# Patient Record
Sex: MALE | Race: WHITE | NOT HISPANIC OR LATINO | Employment: STUDENT | URBAN - METROPOLITAN AREA
[De-identification: names, ages, dates, MRNs, and addresses within clinical notes are randomized per-mention and may not be internally consistent; named-entity substitution may affect disease eponyms.]

---

## 2023-10-31 ENCOUNTER — OFFICE VISIT (OUTPATIENT)
Dept: URGENT CARE | Facility: CLINIC | Age: 23
End: 2023-10-31
Payer: COMMERCIAL

## 2023-10-31 VITALS
TEMPERATURE: 98 F | HEIGHT: 67 IN | OXYGEN SATURATION: 97 % | WEIGHT: 207 LBS | BODY MASS INDEX: 32.49 KG/M2 | HEART RATE: 78 BPM | SYSTOLIC BLOOD PRESSURE: 146 MMHG | DIASTOLIC BLOOD PRESSURE: 85 MMHG | RESPIRATION RATE: 16 BRPM

## 2023-10-31 DIAGNOSIS — K29.00 ACUTE GASTRITIS WITHOUT HEMORRHAGE, UNSPECIFIED GASTRITIS TYPE: Primary | ICD-10-CM

## 2023-10-31 DIAGNOSIS — R10.9 ABDOMINAL PAIN, UNSPECIFIED ABDOMINAL LOCATION: ICD-10-CM

## 2023-10-31 LAB
BILIRUB SERPL-MCNC: NEGATIVE MG/DL
BLOOD, POC UA: NEGATIVE
CLARITY, POC UA: CLEAR
COLOR, POC UA: NORMAL
GLUCOSE UR QL STRIP: NEGATIVE
KETONES UR QL STRIP: NEGATIVE
LEUKOCYTE ESTERASE URINE, POC: NEGATIVE
NITRITE, POC UA: NEGATIVE
PH, POC UA: 6.5
PROTEIN, POC: NEGATIVE
SPECIFIC GRAVITY, POC UA: 1.02
UROBILINOGEN, POC UA: POSITIVE

## 2023-10-31 PROCEDURE — 74022 XR ABDOMEN ACUTE 2 OR MORE VIEWS WITH CHEST: ICD-10-PCS | Mod: TC,,, | Performed by: STUDENT IN AN ORGANIZED HEALTH CARE EDUCATION/TRAINING PROGRAM

## 2023-10-31 PROCEDURE — 99499 UNLISTED E&M SERVICE: CPT | Mod: S$GLB,,, | Performed by: STUDENT IN AN ORGANIZED HEALTH CARE EDUCATION/TRAINING PROGRAM

## 2023-10-31 PROCEDURE — 74022 RADEX COMPL AQT ABD SERIES: CPT | Mod: 26,,, | Performed by: RADIOLOGY

## 2023-10-31 PROCEDURE — 99499 NO LOS: ICD-10-PCS | Mod: S$GLB,,, | Performed by: STUDENT IN AN ORGANIZED HEALTH CARE EDUCATION/TRAINING PROGRAM

## 2023-10-31 PROCEDURE — 81003 POCT URINALYSIS: ICD-10-PCS | Mod: QW,S$GLB,, | Performed by: STUDENT IN AN ORGANIZED HEALTH CARE EDUCATION/TRAINING PROGRAM

## 2023-10-31 PROCEDURE — 81003 URINALYSIS AUTO W/O SCOPE: CPT | Mod: QW,S$GLB,, | Performed by: STUDENT IN AN ORGANIZED HEALTH CARE EDUCATION/TRAINING PROGRAM

## 2023-10-31 PROCEDURE — 74022 RADEX COMPL AQT ABD SERIES: CPT | Mod: TC,,, | Performed by: STUDENT IN AN ORGANIZED HEALTH CARE EDUCATION/TRAINING PROGRAM

## 2023-10-31 PROCEDURE — 74022 XR ABDOMEN ACUTE 2 OR MORE VIEWS WITH CHEST: ICD-10-PCS | Mod: 26,,, | Performed by: RADIOLOGY

## 2023-10-31 RX ORDER — OMEPRAZOLE 40 MG/1
40 CAPSULE, DELAYED RELEASE ORAL DAILY
Qty: 30 CAPSULE | Refills: 0 | Status: SHIPPED | OUTPATIENT
Start: 2023-10-31 | End: 2023-11-30

## 2023-10-31 RX ORDER — METHYLPHENIDATE HYDROCHLORIDE 20 MG/1
TABLET ORAL
COMMUNITY
Start: 2023-09-08

## 2023-10-31 RX ORDER — LIDOCAINE HYDROCHLORIDE 20 MG/ML
10 SOLUTION OROPHARYNGEAL ONCE
Status: COMPLETED | OUTPATIENT
Start: 2023-10-31 | End: 2023-10-31

## 2023-10-31 RX ORDER — MAG HYDROX/ALUMINUM HYD/SIMETH 200-200-20
30 SUSPENSION, ORAL (FINAL DOSE FORM) ORAL
Status: COMPLETED | OUTPATIENT
Start: 2023-10-31 | End: 2023-10-31

## 2023-10-31 RX ORDER — DICYCLOMINE HYDROCHLORIDE 10 MG/5ML
20 SOLUTION ORAL
Status: COMPLETED | OUTPATIENT
Start: 2023-10-31 | End: 2023-10-31

## 2023-10-31 RX ADMIN — DICYCLOMINE HYDROCHLORIDE 20 MG: 10 SOLUTION ORAL at 10:10

## 2023-10-31 RX ADMIN — Medication 30 ML: at 10:10

## 2023-10-31 RX ADMIN — LIDOCAINE HYDROCHLORIDE 10 ML: 20 SOLUTION OROPHARYNGEAL at 10:10

## 2023-10-31 NOTE — LETTER
October 31, 2023      [place] [facility]  [facility] [place] [address]  Phone: [phone]  Fax: [phone]       Patient: Ky Collier   YOB: 2000  Date of Visit: 10/31/2023    To Whom It May Concern:    Rosa Collier  was at [facility] on 10/31/2023. The patient may return to work/school on 11/01/2023 with no restrictions. If you have any questions or concerns, or if I can be of further assistance, please do not hesitate to contact me.    Sincerely,    Dr. Rosita Machado, RT

## 2023-10-31 NOTE — PROGRESS NOTES
"Subjective:      Patient ID: Ky Collier is a 23 y.o. male.    Vitals:  height is 5' 7" (1.702 m) and weight is 93.9 kg (207 lb). His temperature is 98.3 °F (36.8 °C). His blood pressure is 146/85 (abnormal) and his pulse is 78. His respiration is 16 and oxygen saturation is 97%.     Chief Complaint: Abdominal Pain     Patient is an [employer] student with complaints of left sided abdominal pain that started 3 days ago. He states that he drank beer last Thursday and Friday. He drank 24 beers within two days. The pain started Saturday morning and has progressively gotten worse. He has been having regular bowel movements but does note that his stool is a little watery at times. The pain is worse with movement. He has no history of bowel issues or surgeries. He took  500 mg of GasEx yesterday with no relief. The pain is not moving. Tylenol helped the pain. He has sharp pain when he moves around.   He has been eating a lot of fast food. No nausea/vomiting.    Abdominal Pain  This is a new problem. The current episode started in the past 7 days. The pain is located in the LLQ and left flank. The pain is at a severity of 2/10. The pain is mild. The quality of the pain is sharp. The abdominal pain does not radiate. Associated symptoms include diarrhea. Pertinent negatives include no belching, constipation, hematochezia, hematuria or vomiting. Exacerbated by: movement. The pain is relieved by Being still. Treatments tried: GasEX. The treatment provided no relief.       Gastrointestinal:  Positive for abdominal pain and diarrhea. Negative for vomiting, constipation and bright red blood in stool.   Genitourinary:  Negative for hematuria.      Objective:     Physical Exam   HENT:   Head: Normocephalic and atraumatic.   Cardiovascular: Normal rate and regular rhythm.   Pulmonary/Chest: Effort normal and breath sounds normal.   Abdominal: Normal appearance. Soft. Bowel sounds are increased. flat abdomen There is abdominal " tenderness in the left upper quadrant and left lower quadrant. There is no rebound and no guarding.   Neurological: He is alert.   Psychiatric: His behavior is normal. Mood normal.     XR ABDOMEN, ACUTE 2 OR MORE VIEWS WITH CHEST    Result Date: 10/31/2023  EXAM:  XR ABDOMEN ACUTE 2 OR MORE VIEWS WITH CHEST CLINICAL HISTORY:  Abdominal pain FINDINGS: No free air. Nonobstructive bowel gas pattern. No abnormal masses or suspicious appearing calcifications. The skeleton is intact. The lung bases appear clear.     No evidence of bowel obstruction. Finalized on: 10/31/2023 10:18 AM By:  Steve Suarez MD [facility]# [id number]      2023-10-31 10:20:46.727    [facility]    Results for orders placed or performed in visit on 10/31/23   POCT URINALYSIS   Result Value Ref Range    WBC, UA negative     Nitrite, UA negative     Urobilinogen, UA positive     Protein, POC negative     pH, UA 6.5     Blood, UA negative     Spec Grav UA 1.025     Ketones, UA negative     Bilirubin, POC negative     Glucose, UA negative     Color, UA Light Yellow     Clarity, UA Clear       Assessment:     1. Acute gastritis without hemorrhage, unspecified gastritis type    2. Abdominal pain, unspecified abdominal location        Plan:   -patient given handout on gastritis     Acute gastritis without hemorrhage, unspecified gastritis type  -     LIDOcaine HCl 2% oral solution 10 mL  -     dicyclomine 10 mg/5 mL syrup 20 mg  -     aluminum-magnesium hydroxide-simethicone 200-200-20 mg/5 mL suspension 30 mL  -     omeprazole (PRILOSEC) 40 MG capsule; Take 1 capsule (40 mg total) by mouth once daily.  Dispense: 30 capsule; Refill: 0    Abdominal pain, unspecified abdominal location  -     POCT URINALYSIS  -     XR ABDOMEN, ACUTE 2 OR MORE VIEWS WITH CHEST; Future; Expected date: 10/31/2023  -     omeprazole (PRILOSEC) 40 MG capsule; Take 1 capsule (40 mg total) by mouth once daily.  Dispense: 30 capsule; Refill: 0      Please follow up with your primary care provider  within 2-5 days if your signs and symptoms have not resolved or worsen.     If your condition worsens or fails to improve we recommend that you receive another evaluation at the emergency room immediately or contact your primary medical clinic to discuss your concerns.   You must understand that you have received an Urgent Care treatment only and that you may be released before all of your medical problems are known or treated. You, the patient, will arrange for follow up care as instructed.        Medical Decision Making:   Differential Diagnosis:   Gastritis 2/2 to alcohol use   Independently Interpreted Test(s):   I have ordered and independently interpreted X-rays - see summary below.       <> Summary of X-Ray Reading(s): No free air. Nonobstructive bowel gas pattern. No abnormal masses or suspicious appearing calcifications. The skeleton is intact.     The lung bases appear clear.           Impression:        No evidence of bowel obstruction.  Clinical Tests:   Lab Tests: Ordered and Reviewed       <> Summary of Lab: UA neg  Radiological Study: Ordered and Reviewed  Urgent Care Management:   Patient is an [employer] student with complaints of left sided abdominal pain that started 3 days ago. He states that he drank beer last Thursday and Friday. He drank 24 beers within two days. The pain started Saturday morning and has progressively gotten worse. He has been having regular bowel movements but does note that his stool is a little watery at times. The pain is worse with movement. He has no history of bowel issues or surgeries. He took  500 mg of GasEx yesterday with no relief. The pain is not moving. Tylenol helped the pain. He has sharp pain when he moves around.   He has been eating a lot of fast food. No nausea/vomiting. PE was remarkable for tenderness in the LUQ and LLQ, he also had increased bowel sounds. UA neg. Abdominal xray was neg. The patient was given a GI cocktain in office. I sent him home with  omeprazole.

## 2023-11-30 ENCOUNTER — OFFICE VISIT (OUTPATIENT)
Dept: URGENT CARE | Facility: CLINIC | Age: 23
End: 2023-11-30
Payer: COMMERCIAL

## 2023-11-30 VITALS
DIASTOLIC BLOOD PRESSURE: 84 MMHG | HEIGHT: 67 IN | SYSTOLIC BLOOD PRESSURE: 124 MMHG | RESPIRATION RATE: 20 BRPM | OXYGEN SATURATION: 95 % | HEART RATE: 118 BPM | WEIGHT: 207 LBS | BODY MASS INDEX: 32.49 KG/M2 | TEMPERATURE: 100 F

## 2023-11-30 DIAGNOSIS — R52 BODY ACHES: ICD-10-CM

## 2023-11-30 DIAGNOSIS — R68.89 FLU-LIKE SYMPTOMS: Primary | ICD-10-CM

## 2023-11-30 DIAGNOSIS — R09.81 NASAL CONGESTION: ICD-10-CM

## 2023-11-30 DIAGNOSIS — J06.9 VIRAL UPPER RESPIRATORY TRACT INFECTION WITH COUGH: ICD-10-CM

## 2023-11-30 DIAGNOSIS — R50.9 FEVER, UNSPECIFIED FEVER CAUSE: ICD-10-CM

## 2023-11-30 DIAGNOSIS — R05.9 COUGH, UNSPECIFIED TYPE: ICD-10-CM

## 2023-11-30 LAB
CTP QC/QA: YES
CTP QC/QA: YES
POC MOLECULAR INFLUENZA A AGN: NEGATIVE
POC MOLECULAR INFLUENZA B AGN: NEGATIVE
SARS-COV-2 AG RESP QL IA.RAPID: NEGATIVE

## 2023-11-30 PROCEDURE — 99213 PR OFFICE/OUTPT VISIT, EST, LEVL III, 20-29 MIN: ICD-10-PCS | Mod: S$GLB,,, | Performed by: NURSE PRACTITIONER

## 2023-11-30 PROCEDURE — 99213 OFFICE O/P EST LOW 20 MIN: CPT | Mod: S$GLB,,, | Performed by: NURSE PRACTITIONER

## 2023-11-30 PROCEDURE — 87502 POCT INFLUENZA A/B MOLECULAR: ICD-10-PCS | Mod: QW,,, | Performed by: NURSE PRACTITIONER

## 2023-11-30 PROCEDURE — 87811 SARS-COV-2 COVID19 W/OPTIC: CPT | Mod: QW,S$GLB,, | Performed by: NURSE PRACTITIONER

## 2023-11-30 PROCEDURE — 87502 INFLUENZA DNA AMP PROBE: CPT | Mod: QW,,, | Performed by: NURSE PRACTITIONER

## 2023-11-30 PROCEDURE — 87811 SARS CORONAVIRUS 2 ANTIGEN POCT, MANUAL READ: ICD-10-PCS | Mod: QW,S$GLB,, | Performed by: NURSE PRACTITIONER

## 2023-11-30 RX ORDER — CODEINE PHOSPHATE AND GUAIFENESIN 10; 100 MG/5ML; MG/5ML
10 SOLUTION ORAL EVERY 8 HOURS PRN
Qty: 118 ML | Refills: 0 | Status: SHIPPED | OUTPATIENT
Start: 2023-11-30

## 2023-11-30 RX ORDER — FLUTICASONE PROPIONATE 50 MCG
1 SPRAY, SUSPENSION (ML) NASAL DAILY
Qty: 9.9 ML | Refills: 0 | Status: SHIPPED | OUTPATIENT
Start: 2023-11-30

## 2023-11-30 RX ORDER — ACETAMINOPHEN 500 MG
500 TABLET ORAL
Status: COMPLETED | OUTPATIENT
Start: 2023-11-30 | End: 2023-11-30

## 2023-11-30 RX ORDER — OSELTAMIVIR PHOSPHATE 75 MG/1
75 CAPSULE ORAL 2 TIMES DAILY
Qty: 10 CAPSULE | Refills: 0 | Status: SHIPPED | OUTPATIENT
Start: 2023-11-30 | End: 2023-12-05

## 2023-11-30 RX ORDER — AZELASTINE 1 MG/ML
1 SPRAY, METERED NASAL 2 TIMES DAILY
Qty: 30 ML | Refills: 0 | Status: SHIPPED | OUTPATIENT
Start: 2023-11-30 | End: 2024-11-29

## 2023-11-30 RX ORDER — BROMPHENIRAMINE MALEATE, PSEUDOEPHEDRINE HYDROCHLORIDE, AND DEXTROMETHORPHAN HYDROBROMIDE 2; 30; 10 MG/5ML; MG/5ML; MG/5ML
10 SYRUP ORAL EVERY 4 HOURS PRN
Qty: 120 ML | Refills: 0 | Status: SHIPPED | OUTPATIENT
Start: 2023-11-30

## 2023-11-30 RX ORDER — IBUPROFEN 200 MG
600 TABLET ORAL
Status: COMPLETED | OUTPATIENT
Start: 2023-11-30 | End: 2023-11-30

## 2023-11-30 RX ADMIN — Medication 600 MG: at 08:11

## 2023-11-30 RX ADMIN — Medication 500 MG: at 08:11

## 2023-11-30 NOTE — LETTER
November 30, 2023      [place] [facility]  [facility] [place] [address]  Phone: [phone]  Fax: [phone]       Patient: Ky Collier   YOB: 2000  Date of Visit: 11/30/2023    To Whom It May Concern:    Rosa Collier  was at [facility] on 11/30/2023. The patient may return to work/school on 12/4/2023  with no restrictions. If you have any questions or concerns, or if I can be of further assistance, please do not hesitate to contact me.    Sincerely,    Marcia Foley NP

## 2023-11-30 NOTE — PATIENT INSTRUCTIONS
Please follow up with your primary care provider within 2-5 days if your signs and symptoms have not resolved or worsen.     If your condition worsens or fails to improve we recommend that you receive another evaluation at the emergency room immediately or contact your primary medical clinic to discuss your concerns.   You must understand that you have received an Urgent Care treatment only and that you may be released before all of your medical problems are known or treated. You, the patient, will arrange for follow up care as instructed.       You have tested negative for COVID on our Binax test. As a follow up the recommendation is if you have symptoms within the first 7 days to retest within 48 hours. I you have NO SYMPTONS then you should test every 48 hours two more times.    Please take Tamilfu as prescribed.  Increase your intake of water and get plenty of rest.  Take PLAIN Mucinex twice daily to loosen and thin respiratory mucus to make it easier for you to cough up.  Alternate Tylenol/Motrin as needed for fever and/or body aches.      General Instructions for Upper Respiratory Infection (URI):     Alternate Tylenol and Ibuprofen every 3 hrs for fever, pain and inflammation.   Avoid NSAIDs (Ibuprofen, Aleve, Motrin, Aspirin) if you are pregnant, or have advanced kidney disease or history of stomach ulcers/bleeding.     Sore throat/Post Nasal Drip:  Salt water gargles, chloraseptic spray, lozenges, or cough drops   Honey/lemon water or warm tea   Cepachol   Zantac will help if there is reflux from the post nasal drip and helpful to take at night     Sinus Congestion/Runny nose:  Zyrtec/Claritin/Allegra during the day and Benadryl at night as needed  Mucinex, Dayquil, or Coricidin   If you DO NOT have Hypertension (high blood pressure) or any history of palpitations, it is ok to take over the counter Sudafed or Mucinex D or Allegra-D or Claritin-D or Zyrtec-D.  If you do take one of the above, it is ok to  combine that with plain over the counter Mucinex or Allegra or Claritin or Zyrtec. If, for example, you are taking Zyrtec -D, you can combine that with Mucinex, but not Mucinex-D. If you are taking Mucinex-D, you can combine that with plain Allegra or Claritin or Zyrtec.  If you DO have Hypertension or palpitations, it is safe to take Coricidin HBP for relief of sinus symptoms.  Nasal saline spray reduces inflammation and dryness  Flonase OTC or Nasacort OTC to help decrease inflammation in nasal turbinates and allow sinuses to drain  Warm face compresses/hot showers as often as you can to open sinuses and allow to drain.   Vicks vapor rub and/or humidifier at night  Cold-eeze helps to reduce the duration of URI symptoms if taken early  Elderberry, Emergen-C, and/or Zinc to reduce duration of viral URI symptoms    Cough:  Robitussin or Delsym as needed  Cough drops  Vicks vapor rub and/or humidifier at night       Rest as much as you can     Your symptoms are likely viral and will typically last 7-10 days, maybe longer depending on how it affects your body.  You are contagious until day 5-7, so minimize contact with others to reduce the spread to others and stay home from work or school as we discussed. Dehydration is preventable but is one of the main reasons why you will feel so badly. Drink pedialyte, gatorade or propel. Stay hydrated.  Antibiotics are not needed unless a complication( such as Otitis Media, Bacterial sinus infection or pneumonia) develops. Taking antibiotics for Flu/Cold is not supported by evidence-based medicine and can expose you to unnecessary side effects of the medication, such as anaphylaxis, yeast infection and leads to antibiotic resistance.     Please follow up with your primary care provider within 5-7 days if your signs and symptoms have not resolved or worsen.  If your condition worsens or fails to improve we recommend that you receive another evaluation at the emergency  room  immediately or contact your primary medical clinic to discuss your concerns.  You must understand that you have received an Urgent Care treatment only and that you may be released before all of  your medical problems are known or treated. You, the patient, will arrange for follow up care as instructed.    Go to Emergency Room immediately if you experience any:  Chest pain, shortness of breath, wheezing or difficulty breathing,  Severe headache, face, neck or ear pain,  New rash,  Fever over 101.5º F (38.6 C) for more than three days,  Confusion, behavior change or seizure,  Severe weakness or dizziness or passing out

## 2023-11-30 NOTE — PROGRESS NOTES
"Subjective:      Patient ID: Ky Collier is a 23 y.o. male.    Vitals:  height is 5' 7" (1.702 m) and weight is 93.9 kg (207 lb). His temperature is 100.1 °F (37.8 °C). His blood pressure is 124/84 and his pulse is 118 (abnormal). His respiration is 20 and oxygen saturation is 95%.     Chief Complaint: Generalized Body Aches    Patient complains of fever, body aches, nasal congestion and a cough for several days. Yesterday, he was nauseated and vomited twice. He doesn't have a thermometer but has felt feverish. He has been taking sudfed, a cough medication and tylenol. His last dose of tylenol was around midnight.     Other  This is a new problem. The current episode started in the past 7 days. The problem occurs constantly. The problem has been gradually worsening. Associated symptoms include chills, congestion, coughing, diaphoresis, a fever, headaches, nausea, a sore throat and vomiting. Pertinent negatives include no abdominal pain, chest pain, neck pain or rash. Treatments tried: sudafed and tylenol. The treatment provided mild relief.       Constitution: Positive for activity change, appetite change, chills, sweating, fever and generalized weakness.   HENT:  Positive for congestion, postnasal drip, sinus pressure and sore throat. Negative for ear pain.    Neck: Positive for painful lymph nodes. Negative for neck pain and neck stiffness.   Cardiovascular:  Negative for chest pain, leg swelling, palpitations, sob on exertion and passing out.   Eyes:  Negative for eye discharge, eye itching, eye pain and eye redness.   Respiratory:  Positive for chest tightness, cough and sputum production. Negative for shortness of breath, wheezing and asthma.         Mostly dry cough but sometimes productive of scant amount of thick whitish yellow sputum.  +  diffuse upper chest tenderness   Gastrointestinal:  Positive for nausea and vomiting. Negative for abdominal pain and diarrhea.   Skin:  Negative for color change, " pale and rash.   Allergic/Immunologic: Negative for asthma.   Neurological:  Positive for headaches. Negative for dizziness, light-headedness, passing out, disorientation and altered mental status.   Hematologic/Lymphatic: Positive for swollen lymph nodes.   Psychiatric/Behavioral:  Negative for altered mental status, disorientation and confusion.       Objective:     Physical Exam   Constitutional: He is oriented to person, place, and time.  Non-toxic appearance. He appears ill. No distress.   HENT:   Head: Normocephalic and atraumatic.   Ears:   Right Ear: Tympanic membrane is not perforated, not erythematous, not retracted and not bulging. A middle ear effusion is present.   Left Ear: Tympanic membrane is not perforated, not erythematous, not retracted and not bulging. A middle ear effusion is present.   Nose: Mucosal edema, rhinorrhea and congestion present. Right sinus exhibits maxillary sinus tenderness. Left sinus exhibits maxillary sinus tenderness.   Mouth/Throat: Uvula is midline and mucous membranes are normal. No trismus in the jaw. No uvula swelling. Posterior oropharyngeal erythema and cobblestoning present. No oropharyngeal exudate, posterior oropharyngeal edema or tonsillar abscesses. No tonsillar exudate.   Eyes: Conjunctivae are normal.   Neck: Neck supple. No neck rigidity present.   Cardiovascular: Regular rhythm and normal heart sounds. Tachycardia present.   Pulmonary/Chest: Effort normal and breath sounds normal. He has no wheezes. He has no rhonchi. He has no rales.         Comments: Persistent dry cough noted during entire exam    Abdominal: Normal appearance. Soft. flat abdomen There is no abdominal tenderness.   Musculoskeletal: Normal range of motion.         General: Normal range of motion.   Lymphadenopathy:     He has cervical adenopathy.        Right cervical: Superficial cervical adenopathy present.        Left cervical: Superficial cervical adenopathy present.   Neurological: no  focal deficit. He is alert, oriented to person, place, and time and at baseline.   Skin: Skin is warm, dry, not diaphoretic and no rash.   Psychiatric: His behavior is normal. Mood, judgment and thought content normal.   Nursing note and vitals reviewed.      Assessment:     1. Flu-like symptoms    2. Body aches    3. Cough, unspecified type    4. Fever, unspecified fever cause    5. Nasal congestion    6. Viral upper respiratory tract infection with cough        Plan:     Office Visit on 11/30/2023   Component Date Value Ref Range Status    POC Molecular Influenza A Ag 11/30/2023 Negative  Negative, Not Reported Final    POC Molecular Influenza B Ag 11/30/2023 Negative  Negative, Not Reported Final     Acceptable 11/30/2023 Yes   Final    SARS Coronavirus 2 Antigen 11/30/2023 Negative  Negative Final     Acceptable 11/30/2023 Yes   Final       Flu-like symptoms  -     brompheniramine-pseudoeph-DM (BROMFED DM) 2-30-10 mg/5 mL Syrp; Take 10 mLs by mouth every 4 (four) hours as needed (cough/congestion).  Dispense: 120 mL; Refill: 0  -     azelastine (ASTELIN) 137 mcg (0.1 %) nasal spray; 1 spray (137 mcg total) by Nasal route 2 (two) times daily.  Dispense: 30 mL; Refill: 0  -     fluticasone propionate (FLONASE) 50 mcg/actuation nasal spray; 1 spray (50 mcg total) by Each Nostril route once daily.  Dispense: 9.9 mL; Refill: 0  -     oseltamivir (TAMIFLU) 75 MG capsule; Take 1 capsule (75 mg total) by mouth 2 (two) times daily. for 5 days  Dispense: 10 capsule; Refill: 0  -     guaiFENesin-codeine 100-10 mg/5 ml (TUSSI-ORGANIDIN NR)  mg/5 mL syrup; Take 10 mLs by mouth every 8 (eight) hours as needed for Cough or Congestion.  Dispense: 118 mL; Refill: 0    Body aches  -     POCT Influenza A/B MOLECULAR  -     oseltamivir (TAMIFLU) 75 MG capsule; Take 1 capsule (75 mg total) by mouth 2 (two) times daily. for 5 days  Dispense: 10 capsule; Refill: 0  -     SARS Coronavirus 2 Antigen,  POCT Manual Read    Cough, unspecified type  -     POCT Influenza A/B MOLECULAR  -     guaiFENesin-codeine 100-10 mg/5 ml (TUSSI-ORGANIDIN NR)  mg/5 mL syrup; Take 10 mLs by mouth every 8 (eight) hours as needed for Cough or Congestion.  Dispense: 118 mL; Refill: 0  -     SARS Coronavirus 2 Antigen, POCT Manual Read    Fever, unspecified fever cause  -     POCT Influenza A/B MOLECULAR  -     ibuprofen tablet 600 mg  -     acetaminophen tablet 500 mg  -     oseltamivir (TAMIFLU) 75 MG capsule; Take 1 capsule (75 mg total) by mouth 2 (two) times daily. for 5 days  Dispense: 10 capsule; Refill: 0  -     SARS Coronavirus 2 Antigen, POCT Manual Read    Nasal congestion  -     POCT Influenza A/B MOLECULAR  -     brompheniramine-pseudoeph-DM (BROMFED DM) 2-30-10 mg/5 mL Syrp; Take 10 mLs by mouth every 4 (four) hours as needed (cough/congestion).  Dispense: 120 mL; Refill: 0  -     azelastine (ASTELIN) 137 mcg (0.1 %) nasal spray; 1 spray (137 mcg total) by Nasal route 2 (two) times daily.  Dispense: 30 mL; Refill: 0  -     fluticasone propionate (FLONASE) 50 mcg/actuation nasal spray; 1 spray (50 mcg total) by Each Nostril route once daily.  Dispense: 9.9 mL; Refill: 0  -     oseltamivir (TAMIFLU) 75 MG capsule; Take 1 capsule (75 mg total) by mouth 2 (two) times daily. for 5 days  Dispense: 10 capsule; Refill: 0  -     SARS Coronavirus 2 Antigen, POCT Manual Read    Viral upper respiratory tract infection with cough  -     brompheniramine-pseudoeph-DM (BROMFED DM) 2-30-10 mg/5 mL Syrp; Take 10 mLs by mouth every 4 (four) hours as needed (cough/congestion).  Dispense: 120 mL; Refill: 0  -     azelastine (ASTELIN) 137 mcg (0.1 %) nasal spray; 1 spray (137 mcg total) by Nasal route 2 (two) times daily.  Dispense: 30 mL; Refill: 0  -     fluticasone propionate (FLONASE) 50 mcg/actuation nasal spray; 1 spray (50 mcg total) by Each Nostril route once daily.  Dispense: 9.9 mL; Refill: 0  -     oseltamivir (TAMIFLU) 75 MG  capsule; Take 1 capsule (75 mg total) by mouth 2 (two) times daily. for 5 days  Dispense: 10 capsule; Refill: 0  -     guaiFENesin-codeine 100-10 mg/5 ml (TUSSI-ORGANIDIN NR)  mg/5 mL syrup; Take 10 mLs by mouth every 8 (eight) hours as needed for Cough or Congestion.  Dispense: 118 mL; Refill: 0          Medical Decision Making:   Clinical Tests:   Lab Tests: Reviewed       <> Summary of Lab: POCT covid (-)  POCT Influenza (-)  Urgent Care Management:  Will proceed with Influenza treatment with Tamiflu given pt's HPI and clinical exam are most consistent with Influenza diagnosis.  Discussed treatment options with patient. Patient expressed verbal understanding and agreement with treatment plan.         Patient Instructions   Please follow up with your primary care provider within 2-5 days if your signs and symptoms have not resolved or worsen.     If your condition worsens or fails to improve we recommend that you receive another evaluation at the emergency room immediately or contact your primary medical clinic to discuss your concerns.   You must understand that you have received an Urgent Care treatment only and that you may be released before all of your medical problems are known or treated. You, the patient, will arrange for follow up care as instructed.       You have tested negative for COVID on our Binax test. As a follow up the recommendation is if you have symptoms within the first 7 days to retest within 48 hours. I you have NO SYMPTONS then you should test every 48 hours two more times.    Please take Tamilfu as prescribed.  Increase your intake of water and get plenty of rest.  Take PLAIN Mucinex twice daily to loosen and thin respiratory mucus to make it easier for you to cough up.  Alternate Tylenol/Motrin as needed for fever and/or body aches.      General Instructions for Upper Respiratory Infection (URI):     Alternate Tylenol and Ibuprofen every 3 hrs for fever, pain and inflammation.    Avoid NSAIDs (Ibuprofen, Aleve, Motrin, Aspirin) if you are pregnant, or have advanced kidney disease or history of stomach ulcers/bleeding.     Sore throat/Post Nasal Drip:  Salt water gargles, chloraseptic spray, lozenges, or cough drops   Honey/lemon water or warm tea   Cepachol   Zantac will help if there is reflux from the post nasal drip and helpful to take at night     Sinus Congestion/Runny nose:  Zyrtec/Claritin/Allegra during the day and Benadryl at night as needed  Mucinex, Dayquil, or Coricidin   If you DO NOT have Hypertension (high blood pressure) or any history of palpitations, it is ok to take over the counter Sudafed or Mucinex D or Allegra-D or Claritin-D or Zyrtec-D.  If you do take one of the above, it is ok to combine that with plain over the counter Mucinex or Allegra or Claritin or Zyrtec. If, for example, you are taking Zyrtec -D, you can combine that with Mucinex, but not Mucinex-D. If you are taking Mucinex-D, you can combine that with plain Allegra or Claritin or Zyrtec.  If you DO have Hypertension or palpitations, it is safe to take Coricidin HBP for relief of sinus symptoms.  Nasal saline spray reduces inflammation and dryness  Flonase OTC or Nasacort OTC to help decrease inflammation in nasal turbinates and allow sinuses to drain  Warm face compresses/hot showers as often as you can to open sinuses and allow to drain.   Vicks vapor rub and/or humidifier at night  Cold-eeze helps to reduce the duration of URI symptoms if taken early  Elderberry, Emergen-C, and/or Zinc to reduce duration of viral URI symptoms    Cough:  Robitussin or Delsym as needed  Cough drops  Vicks vapor rub and/or humidifier at night       Rest as much as you can     Your symptoms are likely viral and will typically last 7-10 days, maybe longer depending on how it affects your body.  You are contagious until day 5-7, so minimize contact with others to reduce the spread to others and stay home from work or school  as we discussed. Dehydration is preventable but is one of the main reasons why you will feel so badly. Drink pedialyte, gatorade or propel. Stay hydrated.  Antibiotics are not needed unless a complication( such as Otitis Media, Bacterial sinus infection or pneumonia) develops. Taking antibiotics for Flu/Cold is not supported by evidence-based medicine and can expose you to unnecessary side effects of the medication, such as anaphylaxis, yeast infection and leads to antibiotic resistance.     Please follow up with your primary care provider within 5-7 days if your signs and symptoms have not resolved or worsen.  If your condition worsens or fails to improve we recommend that you receive another evaluation at the emergency  room immediately or contact your primary medical clinic to discuss your concerns.  You must understand that you have received an Urgent Care treatment only and that you may be released before all of  your medical problems are known or treated. You, the patient, will arrange for follow up care as instructed.    Go to Emergency Room immediately if you experience any:  Chest pain, shortness of breath, wheezing or difficulty breathing,  Severe headache, face, neck or ear pain,  New rash,  Fever over 101.5º F (38.6 C) for more than three days,  Confusion, behavior change or seizure,  Severe weakness or dizziness or passing out

## 2023-11-30 NOTE — LETTER
November 30, 2023      [facility] [facility]  [facility] [facility] [place] [address]  Phone: [phone]  Fax: [phone]       Patient: Ky Collier   YOB: 2000  Date of Visit: 11/30/2023    To Whom It May Concern:    Rosa Collier  was at [facility] on 11/30/2023. The patient may return to work/school on 12/1/2023 with no restrictions. If you have any questions or concerns, or if I can be of further assistance, please do not hesitate to contact me.    Sincerely,    Marcia Foley NP

## 2025-03-19 ENCOUNTER — OFFICE VISIT (OUTPATIENT)
Dept: URGENT CARE | Facility: CLINIC | Age: 25
End: 2025-03-19
Payer: COMMERCIAL

## 2025-03-19 VITALS
WEIGHT: 207 LBS | DIASTOLIC BLOOD PRESSURE: 80 MMHG | RESPIRATION RATE: 16 BRPM | HEIGHT: 67 IN | BODY MASS INDEX: 32.49 KG/M2 | HEART RATE: 75 BPM | OXYGEN SATURATION: 97 % | SYSTOLIC BLOOD PRESSURE: 136 MMHG | TEMPERATURE: 98 F

## 2025-03-19 DIAGNOSIS — J06.9 UPPER RESPIRATORY INFECTION WITH COUGH AND CONGESTION: Primary | ICD-10-CM

## 2025-03-19 DIAGNOSIS — J02.9 SORE THROAT: ICD-10-CM

## 2025-03-19 DIAGNOSIS — J00 ACUTE NASOPHARYNGITIS (COMMON COLD): ICD-10-CM

## 2025-03-19 LAB
CTP QC/QA: YES
CTP QC/QA: YES
MOLECULAR STREP A: NEGATIVE
POC MOLECULAR INFLUENZA A AGN: NEGATIVE
POC MOLECULAR INFLUENZA B AGN: NEGATIVE

## 2025-03-19 RX ORDER — BROMPHENIRAMINE MALEATE, PSEUDOEPHEDRINE HYDROCHLORIDE, AND DEXTROMETHORPHAN HYDROBROMIDE 2; 30; 10 MG/5ML; MG/5ML; MG/5ML
10 SYRUP ORAL EVERY 4 HOURS PRN
Qty: 120 ML | Refills: 0 | Status: SHIPPED | OUTPATIENT
Start: 2025-03-19

## 2025-03-19 NOTE — PATIENT INSTRUCTIONS
Please follow up with your primary care provider within 2-5 days if your signs and symptoms have not resolved or worsen.     If your condition worsens or fails to improve we recommend that you receive another evaluation at the emergency room immediately or contact your primary medical clinic to discuss your concerns.   You must understand that you have received an Urgent Care treatment only and that you may be released before all of your medical problems are known or treated. You, the patient, will arrange for follow up care as instructed.       Alternate tylenol/Motrin as needed for fever.  Increase oral fluids.  Rest.  OTC Jeronimo Med sinus rinses, steam therapy.      The CDC encourages you to follow these strategies to help prevent the spread of Respiratory Viruses when you are sick:  -Stay home and away from others until you are FEVER FREE (without the use of fever reducing medications) AND your symptoms have improved

## 2025-03-19 NOTE — PROGRESS NOTES
"Subjective:      Patient ID: Ky Collier is a 25 y.o. male.    Vitals:  height is 5' 7" (1.702 m) and weight is 93.9 kg (207 lb). His temperature is 98.1 °F (36.7 °C). His blood pressure is 136/80 and his pulse is 75. His respiration is 16 and oxygen saturation is 97%.     Chief Complaint: Sore Throat, cold sweats (At night), and Cough (mild)    Pt is [employer] student in for sore throat, mild cough, some night sweats, and congestion that started Monday night. He took some tylenol at 4 in the morning but nothing else otc.  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 Pertinent negatives include no headaches or shortness of breath.   Cough  Associated symptoms include postnasal drip and a sore throat. Pertinent negatives include no chills, fever, headaches, myalgias, rash, shortness of breath or wheezing.       Constitution: Positive for sweating. Negative for chills, fatigue and fever.   HENT:  Positive for congestion, postnasal drip and sore throat. Negative for sinus pain and sinus pressure.    Neck: Positive for painful lymph nodes.   Respiratory:  Positive for cough. Negative for sputum production, shortness of breath, wheezing and asthma.    Musculoskeletal:  Negative for muscle ache.   Skin:  Negative for color change, pale and rash.   Allergic/Immunologic: Negative for asthma.   Neurological:  Negative for headaches, disorientation and altered mental status.   Hematologic/Lymphatic: Positive for swollen lymph nodes.   Psychiatric/Behavioral:  Negative for altered mental status, disorientation and confusion.       Objective:     Physical Exam   Constitutional: He is oriented to person, place, and time. He appears well-developed. He is cooperative.  Non-toxic appearance. He does not appear ill. No distress.   HENT:   Head: Normocephalic and atraumatic.   Ears:   Right Ear: Hearing normal.   Left Ear: Hearing normal.   Nose: Mucosal edema, " rhinorrhea and congestion present. No nasal deformity. No epistaxis. Right sinus exhibits no maxillary sinus tenderness and no frontal sinus tenderness. Left sinus exhibits no maxillary sinus tenderness and no frontal sinus tenderness.   Mouth/Throat: Uvula is midline and mucous membranes are normal. Mucous membranes are moist. No trismus in the jaw. Normal dentition. No uvula swelling. Posterior oropharyngeal erythema and cobblestoning present. No oropharyngeal exudate or posterior oropharyngeal edema. No tonsillar exudate.   Eyes: Conjunctivae and lids are normal. No scleral icterus.   Neck: Trachea normal and phonation normal. Neck supple. No edema present. No erythema present. No neck rigidity present.   Cardiovascular: Normal rate and regular rhythm.   Pulmonary/Chest: Effort normal and breath sounds normal. No respiratory distress. He has no decreased breath sounds. He has no rhonchi.   Abdominal: Normal appearance.   Musculoskeletal: Normal range of motion.         General: No deformity. Normal range of motion.   Lymphadenopathy:     He has cervical adenopathy.        Right cervical: Superficial cervical adenopathy present.        Left cervical: Superficial cervical adenopathy present.   Neurological: He is alert and oriented to person, place, and time. He exhibits normal muscle tone. Coordination normal.   Skin: Skin is warm, dry, intact, not diaphoretic and not pale.   Psychiatric: His speech is normal and behavior is normal. Judgment and thought content normal.   Nursing note and vitals reviewed.      Assessment:     1. Upper respiratory infection with cough and congestion    2. Sore throat    3. Acute nasopharyngitis (common cold)        Plan:       Office Visit on 03/19/2025   Component Date Value Ref Range Status    Molecular Strep A, POC 03/19/2025 Negative  Negative Final     Acceptable 03/19/2025 Yes   Final    POC Molecular Influenza A Ag 03/19/2025 Negative  Negative Final    POC  Molecular Influenza B Ag 03/19/2025 Negative  Negative Final     Acceptable 03/19/2025 Yes   Final         Upper respiratory infection with cough and congestion  -     brompheniramine-pseudoeph-DM (BROMFED DM) 2-30-10 mg/5 mL Syrp; Take 10 mLs by mouth every 4 (four) hours as needed (congestion, cough, nasal drainage).  Dispense: 120 mL; Refill: 0    Sore throat  -     POCT Strep A, Molecular  -     POCT Influenza A/B MOLECULAR    Acute nasopharyngitis (common cold)  -     brompheniramine-pseudoeph-DM (BROMFED DM) 2-30-10 mg/5 mL Syrp; Take 10 mLs by mouth every 4 (four) hours as needed (congestion, cough, nasal drainage).  Dispense: 120 mL; Refill: 0                Patient Instructions   Please follow up with your primary care provider within 2-5 days if your signs and symptoms have not resolved or worsen.     If your condition worsens or fails to improve we recommend that you receive another evaluation at the emergency room immediately or contact your primary medical clinic to discuss your concerns.   You must understand that you have received an Urgent Care treatment only and that you may be released before all of your medical problems are known or treated. You, the patient, will arrange for follow up care as instructed.       Alternate tylenol/Motrin as needed for fever.  Increase oral fluids.  Rest.  OTC Jeronimo Med sinus rinses, steam therapy.      The CDC encourages you to follow these strategies to help prevent the spread of Respiratory Viruses when you are sick:  -Stay home and away from others until you are FEVER FREE (without the use of fever reducing medications) AND your symptoms have improved

## 2025-03-19 NOTE — LETTER
March 19, 2025      Urgent Care - [address] [address]  Phone: [phone]  Fax: [phone]       Patient: Ky Collier   YOB: 2000  Date of Visit: 03/19/2025    To Whom It May Concern:    Rosa Collier  was at [facility] on 03/19/2025. The patient may return to work/school on 3/20/2025 with no restrictions. If you have any questions or concerns, or if I can be of further assistance, please do not hesitate to contact me.    Sincerely,    Marcia Foley NP